# Patient Record
Sex: FEMALE | Race: WHITE | NOT HISPANIC OR LATINO | URBAN - METROPOLITAN AREA
[De-identification: names, ages, dates, MRNs, and addresses within clinical notes are randomized per-mention and may not be internally consistent; named-entity substitution may affect disease eponyms.]

---

## 2019-10-17 NOTE — PATIENT DISCUSSION
New Prescription: Prolensa (bromfenac): drops: 0.07% 1 drop every morning as directed into right eye 10-

## 2019-10-17 NOTE — PATIENT DISCUSSION
Surgery Drop Counseling:  I have prescribed Omni PREDnisolone sodium phosphate-GATIfloxacin-BROMfenac combination drop for use as directed before and after cataract surgery.

## 2019-10-21 NOTE — PATIENT DISCUSSION
ALEC COUNSELING:  I have explained to the patient the underlying pathogenesis of this disease, including the significance of the disc-at-risk phenomenon. Unfortunately, there is no proven therapy that would restore visual function in the affected eye. I explained that the potential for visual loss in the fellow eye is a significant concern. There is no proven therapy for the affected eye, but we do believe that even brief periods of systemic hypotension could potentially trigger an ischemic event in the fellow eye. For this reason, the patient is instructed to be very cautious with any medication that might lower blood pressure below what would be considered normal for this patient. Likewise, the patient is advised to be careful with any prolonged surgical procedures that might involve intraoperative systemic hypotension.

## 2019-10-21 NOTE — PATIENT DISCUSSION
CHRONIC OPTIC NEUROPATHY, OD:  EXAM FINDINGS INCLUDING OPTIC DISC PALLOR AND ATTENUATION OF RNFL LAYER CONSISTENT WITH CHRONIC NAION. DISCUSSED RISK FACTORS AND BP CONTROL. NO TREATMENT INDICATED AT THIS TIME. RETURN AS SCHEDULED.

## 2019-11-19 NOTE — PATIENT DISCUSSION
Post-Op Instructions OD: Durezol 2 times daily for 1 week, then 1 time per day for 1 week, then discontinue. Prolensa 1 time per day for 5 weeks then discontinue. [place] instructions provided. Add Systane TID-QID OD for dryness. Sample provided.

## 2020-12-17 NOTE — PATIENT DISCUSSION
THADGUECULA, OU: CONTINUE ARTIFICIAL TEARS BID - QID. DECREASE OUTDOOR EXPOSURE AND USE UV PROTECTION/ SUNGLASSES WITH OUTDOOR ACTIVITIES. RETURN FOR FOLLOW UP AS SCHEDULED.

## 2020-12-17 NOTE — PATIENT DISCUSSION
Continue: Systane Complete (propylene glycol): drops: 0.6% 1 drop twice a day as needed into both eyes

## 2020-12-17 NOTE — PATIENT DISCUSSION
POSTERIOR CAPSULAR FIBROSIS, OS:  VISUALLY SIGNIFICANT. SCHEDULE YAG CAPSULOTOMY OS. REFER TO DR. Soler Speaker FOR RETINAL CLEARANCE/EVAL OF VISUAL POTENTIAL AND CONFIRM BRAO DX OR VASCULAR ETIOLOGY FOR PATIENTS OPTIC ATROPHY.

## 2020-12-17 NOTE — PATIENT DISCUSSION
OPTIC ATROPHY, OD:  APPEARS UNCHANGED. REFER TO DR. David Rocha TO EVAL IF 2/2 VASCULAR ETIOLOGY VS TRUE OPTIC ATROPHY. ORDER HVF TO CONFIRM DX. PATIENT DENIES PAIN. INSTRUCTED PATIENT TO CALL IF ANY ONSET OF PAIN. RETURN FOR FOLLOW-UP AS SCHEDULED.

## 2020-12-17 NOTE — PATIENT DISCUSSION
BRANCH RETINAL ARTERY OCCLUSION, OD: PER DR. Praveen Montilla. OLD AND APPEARS STABLE. FOLLOW BP VERY CLOSE AND REFER TO DR. Soler Speaker FOR EVAULATION/CONFIRMATION OF DX. ORDER HVF TO CONFIRM DX.

## 2020-12-17 NOTE — PATIENT DISCUSSION
MACULAR DRUSEN, OD: STABLE. CONTINUE INCREASED UV PROTECTION. STRESS GOOD DIET AND NO SMOKING. RETURN FOR FOLLOW-UP AS SCHEDULED.

## 2020-12-17 NOTE — PATIENT DISCUSSION
DERMATOCHALASIS / PTOSIS RUL AND MICHELLE : VISUALLY SIGNIFICANT TO PATIENT. SCHEDULE WITH OCULOPLASTIC SPECIALIST IF PATIENT DESIRES.

## 2020-12-17 NOTE — PATIENT DISCUSSION
CATARACTS, OS: VISUALLY SIGNIFICANT. SURGERY INDICATED. PATIENT WISHES TO WAIT AT THIS TIME. GLASSES PRESCRIPTION GIVEN. RTC FOR CAT CHENG 3/2021, WILL CONSIDER BASIC TESTING.

## 2020-12-17 NOTE — PATIENT DISCUSSION
EPIRETINAL MEMBRANE, OD:  VISUALLY SIGNIFICANT. REFER TO RETINA SPECIALIST DR. ZHANG FOR EVALUATION AND TREATMENT.

## 2021-01-06 NOTE — PATIENT DISCUSSION
RETINA IS ATTACHED OU: NO RETINAL VASCULAR OCCLUSIONS; NO HOLES OR TEARS SEEN ON DILATED EXAM TODAY.  RETINAL DETACHMENT SIGNS AND SYMPTOMS REVIEWED

## 2021-03-11 NOTE — PATIENT DISCUSSION
ENTROPION, OD:  VISUALLY SIGNIFICANT TO THE PATIENT. RECOMMEND ARTIFICIAL TEARS OR LUBRICATING OINTMENT AS NEEDED. REFER TO OCULOPLASTIC SPECIALIST.

## 2021-03-11 NOTE — PATIENT DISCUSSION
DEV OU:  PRESCRIBE ARTIFICIAL TEARS BID - QID. DECREASE OUTDOOR EXPOSURE AND USE UV PROTECTION/ SUNGLASSES WITH OUTDOOR ACTIVITIES. RETURN FOR FOLLOW UP AS SCHEDULED.

## 2021-03-11 NOTE — PATIENT DISCUSSION
Branch Retinal Artery Occlusion Counseling:  I have discussed the diagnosis and its pathophysiology with the patient. I have further explained the need for evaluation of underlying medical disorders in conjunction with the patient's primary care doctor. Serious complications can occur including the formation of new abnormal blood vessels, retinal swelling, and elevated eye pressure. Return for follow-up as scheduled or sooner if any change in symptoms.

## 2021-03-11 NOTE — PATIENT DISCUSSION
CONJUNCTIVOCHALASIS, OD:  RECOMMEND GOOD QUALITY ARTIFICIAL TEARS. RETURN FOR FOLLOW-UP AS SCHEDULED.

## 2021-03-11 NOTE — PATIENT DISCUSSION
*CATARACT, OS:  SLOWLY BECOMING VISUALLY SIGNIFICANT BUT NOT BOTHERSOME TO PATIENT AT THIS TIME. SPEC RX OFFERED. FOLLOW AS SCHEDULED.

## 2021-04-23 NOTE — PATIENT DISCUSSION
OPTIC ATROPHY OD: HVF TODAY OD SHOWS SEVERELY DEPRESSED FIELD WITH ONLY SMALL CENTRAL AREA OF VISION REMAINING, OS WITH NON-SPECIFIC POINTS. HVF TODAY, HX AND ONH PALLOR OD MORE CONSISTENT WITH OPTIC NEUROPATHY THAN BRANCH RETINAL ARTERY OCCLUSION. FOLLOW-UP WITH DR. YEAGER AS DIRECTED. RTC WITH DR. OLSEN IN 4 MONTHS FOR FOLLOW-UP. MONITOR.

## 2022-08-18 ENCOUNTER — NEW PATIENT (OUTPATIENT)
Dept: URBAN - METROPOLITAN AREA CLINIC 17 | Facility: CLINIC | Age: 47
End: 2022-08-18

## 2022-08-18 DIAGNOSIS — H43.813: ICD-10-CM

## 2022-08-18 DIAGNOSIS — H52.4: ICD-10-CM

## 2022-08-18 DIAGNOSIS — H04.123: ICD-10-CM

## 2022-08-18 DIAGNOSIS — H52.13: ICD-10-CM

## 2022-08-18 PROCEDURE — 92134 CPTRZ OPH DX IMG PST SGM RTA: CPT

## 2022-08-18 PROCEDURE — 92015 DETERMINE REFRACTIVE STATE: CPT

## 2022-08-18 PROCEDURE — 99204 OFFICE O/P NEW MOD 45 MIN: CPT

## 2022-08-18 ASSESSMENT — VISUAL ACUITY
OS_CC: 20/30-2
OU_CC: 20/20-1
OD_CC: 20/20-1

## 2022-08-18 ASSESSMENT — KERATOMETRY
OD_K2POWER_DIOPTERS: 46.50
OS_K2POWER_DIOPTERS: 46.25
OS_AXISANGLE2_DEGREES: 95
OD_AXISANGLE2_DEGREES: 90
OS_K1POWER_DIOPTERS: 44.75
OD_K1POWER_DIOPTERS: 44.75
OS_AXISANGLE_DEGREES: 5
OD_AXISANGLE_DEGREES: 180

## 2022-08-18 ASSESSMENT — TONOMETRY
OS_IOP_MMHG: 16
OD_IOP_MMHG: 15